# Patient Record
Sex: FEMALE | Race: WHITE | NOT HISPANIC OR LATINO | URBAN - METROPOLITAN AREA
[De-identification: names, ages, dates, MRNs, and addresses within clinical notes are randomized per-mention and may not be internally consistent; named-entity substitution may affect disease eponyms.]

---

## 2023-11-08 ENCOUNTER — OFFICE VISIT (OUTPATIENT)
Dept: OBSTETRICS AND GYNECOLOGY | Facility: CLINIC | Age: 43
End: 2023-11-08
Payer: COMMERCIAL

## 2023-11-08 VITALS
DIASTOLIC BLOOD PRESSURE: 75 MMHG | SYSTOLIC BLOOD PRESSURE: 127 MMHG | WEIGHT: 285.2 LBS | HEIGHT: 64 IN | BODY MASS INDEX: 48.69 KG/M2

## 2023-11-08 DIAGNOSIS — N94.6 DYSMENORRHEA: ICD-10-CM

## 2023-11-08 DIAGNOSIS — Z01.419 WELL WOMAN EXAM WITH ROUTINE GYNECOLOGICAL EXAM: Primary | ICD-10-CM

## 2023-11-08 PROCEDURE — 1036F TOBACCO NON-USER: CPT | Performed by: OBSTETRICS & GYNECOLOGY

## 2023-11-08 PROCEDURE — 99396 PREV VISIT EST AGE 40-64: CPT | Performed by: OBSTETRICS & GYNECOLOGY

## 2023-11-08 RX ORDER — NAPROXEN 500 MG/1
500 TABLET ORAL
Qty: 60 TABLET | Refills: 11 | Status: SHIPPED | OUTPATIENT
Start: 2023-11-08 | End: 2024-11-07

## 2023-11-08 ASSESSMENT — PAIN SCALES - GENERAL: PAINLEVEL: 0-NO PAIN

## 2023-11-08 ASSESSMENT — ENCOUNTER SYMPTOMS
HEMATOLOGIC/LYMPHATIC NEGATIVE: 0
ALLERGIC/IMMUNOLOGIC NEGATIVE: 0
ENDOCRINE NEGATIVE: 0
CARDIOVASCULAR NEGATIVE: 0
NEUROLOGICAL NEGATIVE: 0
CONSTITUTIONAL NEGATIVE: 0
EYES NEGATIVE: 0
PSYCHIATRIC NEGATIVE: 0
GASTROINTESTINAL NEGATIVE: 0
RESPIRATORY NEGATIVE: 0
MUSCULOSKELETAL NEGATIVE: 0

## 2023-11-08 NOTE — PROGRESS NOTES
History Of Present Illness  Routine Gyn Exam  Joel Sood here for routine WWE. Current complaints: none. She takes Naproxen PRN for dysmenorrhea. Rare bleeding with IUD     Gynecologic History  Patient's last menstrual period was 10/30/2023.  Contraception: IUD Kyleena placed   Last Pap: 2020. Results were: norm/neg  Last mammogram: . Results were: normal  Last Colonoscopy:  NA  Last DEXA: NA  Lipid/DM: with PCP    Obstetric History  OB History    Para Term  AB Living   0 0 0 0 0 0   SAB IAB Ectopic Multiple Live Births   0 0 0 0 0        Past Medical History  She has no past medical history on file.    Surgical History  She has a past surgical history that includes Mouth surgery (2014).     Social History  She reports that she has never smoked. She has never used smokeless tobacco. She reports current alcohol use.  Drug: Marijuana.    Family History  No family history on file.     Allergies  Patient has no known allergies.       Physical Exam  Constitutional:       Appearance: Normal appearance.   Pulmonary:      Effort: Pulmonary effort is normal.   Chest:   Breasts:     Right: Normal. No swelling, inverted nipple, mass or skin change.      Left: Normal. No swelling, inverted nipple, mass or skin change.   Abdominal:      General: Abdomen is flat. There is no distension.      Palpations: Abdomen is soft.      Tenderness: There is no abdominal tenderness.   Genitourinary:     Labia:         Right: No rash, tenderness or lesion.         Left: No rash, tenderness or lesion.       Urethra: No prolapse.      Vagina: Normal.      Cervix: Normal.      Uterus: Normal. Not enlarged.       Adnexa:         Right: No mass or tenderness.          Left: No mass.        Comments: IUD strings palpable on bimanual  Neurological:      General: No focal deficit present.      Mental Status: She is alert.   Psychiatric:         Mood and Affect: Mood normal.          Last Recorded Vitals  Blood  "pressure 127/75, height 1.613 m (5' 3.5\"), weight 129 kg (285 lb 3.2 oz), last menstrual period 10/30/2023.    Assessment/Plan   Diagnoses and all orders for this visit:  Well woman exam with routine gynecological exam  -     BI mammo bilateral screening tomosynthesis; Future  Dysmenorrhea  -     naproxen (Naprosyn) 500 mg tablet; Take 1 tablet (500 mg) by mouth 2 times a day with meals.    Contraception:   PAP: due July 2025    Mammogram: ordered   Colonoscopy:  NA   DEXA: NA   Lipid/DM: obtained through PCP    Refill for Naproxen provided.     Juancarlos Brink MD  "

## 2023-11-09 ENCOUNTER — TELEPHONE (OUTPATIENT)
Dept: OBSTETRICS AND GYNECOLOGY | Facility: CLINIC | Age: 43
End: 2023-11-09
Payer: COMMERCIAL

## 2023-11-09 NOTE — TELEPHONE ENCOUNTER
Naproxen with sodium 550 mg is what the pt was prescribed before and pt wanted to know if medication is the same. Pt stated the medication prescribed before works best for her.

## 2024-01-08 ENCOUNTER — APPOINTMENT (OUTPATIENT)
Dept: OBSTETRICS AND GYNECOLOGY | Facility: CLINIC | Age: 44
End: 2024-01-08
Payer: COMMERCIAL

## 2024-04-29 ENCOUNTER — LAB (OUTPATIENT)
Dept: LAB | Facility: LAB | Age: 44
End: 2024-04-29
Payer: COMMERCIAL

## 2024-04-29 DIAGNOSIS — E66.01 MORBID (SEVERE) OBESITY DUE TO EXCESS CALORIES (MULTI): Primary | ICD-10-CM

## 2024-04-29 LAB
ALBUMIN SERPL BCP-MCNC: 4.1 G/DL (ref 3.4–5)
ALP SERPL-CCNC: 55 U/L (ref 33–110)
ALT SERPL W P-5'-P-CCNC: 9 U/L (ref 7–45)
ANION GAP SERPL CALC-SCNC: 11 MMOL/L (ref 10–20)
AST SERPL W P-5'-P-CCNC: 15 U/L (ref 9–39)
BILIRUB DIRECT SERPL-MCNC: 0.1 MG/DL (ref 0–0.3)
BILIRUB SERPL-MCNC: 0.3 MG/DL (ref 0–1.2)
BUN SERPL-MCNC: 17 MG/DL (ref 6–23)
CALCIUM SERPL-MCNC: 9.3 MG/DL (ref 8.6–10.3)
CHLORIDE SERPL-SCNC: 105 MMOL/L (ref 98–107)
CHOLEST SERPL-MCNC: 154 MG/DL (ref 0–199)
CHOLESTEROL/HDL RATIO: 3.4
CO2 SERPL-SCNC: 25 MMOL/L (ref 21–32)
CREAT SERPL-MCNC: 0.85 MG/DL (ref 0.5–1.05)
EGFRCR SERPLBLD CKD-EPI 2021: 87 ML/MIN/1.73M*2
ERYTHROCYTE [DISTWIDTH] IN BLOOD BY AUTOMATED COUNT: 11.9 % (ref 11.5–14.5)
GLUCOSE SERPL-MCNC: 88 MG/DL (ref 74–99)
HCT VFR BLD AUTO: 39.4 % (ref 36–46)
HDLC SERPL-MCNC: 45.4 MG/DL
HGB BLD-MCNC: 13.2 G/DL (ref 12–16)
LDLC SERPL CALC-MCNC: 95 MG/DL
MCH RBC QN AUTO: 31.4 PG (ref 26–34)
MCHC RBC AUTO-ENTMCNC: 33.5 G/DL (ref 32–36)
MCV RBC AUTO: 94 FL (ref 80–100)
NON HDL CHOLESTEROL: 109 MG/DL (ref 0–149)
NRBC BLD-RTO: 0 /100 WBCS (ref 0–0)
PLATELET # BLD AUTO: 306 X10*3/UL (ref 150–450)
POTASSIUM SERPL-SCNC: 4.4 MMOL/L (ref 3.5–5.3)
PROT SERPL-MCNC: 6.5 G/DL (ref 6.4–8.2)
RBC # BLD AUTO: 4.2 X10*6/UL (ref 4–5.2)
SODIUM SERPL-SCNC: 137 MMOL/L (ref 136–145)
TRIGL SERPL-MCNC: 67 MG/DL (ref 0–149)
VLDL: 13 MG/DL (ref 0–40)
WBC # BLD AUTO: 8.7 X10*3/UL (ref 4.4–11.3)

## 2024-04-29 PROCEDURE — 82248 BILIRUBIN DIRECT: CPT

## 2024-04-29 PROCEDURE — 85027 COMPLETE CBC AUTOMATED: CPT

## 2024-04-29 PROCEDURE — 36415 COLL VENOUS BLD VENIPUNCTURE: CPT

## 2024-04-29 PROCEDURE — 80061 LIPID PANEL: CPT

## 2024-04-29 PROCEDURE — 80053 COMPREHEN METABOLIC PANEL: CPT

## 2024-11-15 DIAGNOSIS — N94.6 DYSMENORRHEA: ICD-10-CM

## 2024-11-15 RX ORDER — NAPROXEN 500 MG/1
500 TABLET ORAL
Qty: 60 TABLET | Refills: 11 | Status: SHIPPED | OUTPATIENT
Start: 2024-11-15

## 2024-11-27 ENCOUNTER — APPOINTMENT (OUTPATIENT)
Dept: OBSTETRICS AND GYNECOLOGY | Facility: CLINIC | Age: 44
End: 2024-11-27
Payer: COMMERCIAL

## 2024-11-27 ENCOUNTER — APPOINTMENT (OUTPATIENT)
Dept: RADIOLOGY | Facility: CLINIC | Age: 44
End: 2024-11-27
Payer: COMMERCIAL

## 2024-11-27 VITALS
SYSTOLIC BLOOD PRESSURE: 140 MMHG | DIASTOLIC BLOOD PRESSURE: 83 MMHG | HEIGHT: 64 IN | BODY MASS INDEX: 41.83 KG/M2 | WEIGHT: 245 LBS

## 2024-11-27 DIAGNOSIS — Z01.419 WELL WOMAN EXAM WITH ROUTINE GYNECOLOGICAL EXAM: Primary | ICD-10-CM

## 2024-11-27 PROCEDURE — 99396 PREV VISIT EST AGE 40-64: CPT | Performed by: OBSTETRICS & GYNECOLOGY

## 2024-11-27 PROCEDURE — 1036F TOBACCO NON-USER: CPT | Performed by: OBSTETRICS & GYNECOLOGY

## 2024-11-27 PROCEDURE — 3008F BODY MASS INDEX DOCD: CPT | Performed by: OBSTETRICS & GYNECOLOGY

## 2024-11-27 PROCEDURE — 96160 PT-FOCUSED HLTH RISK ASSMT: CPT | Performed by: OBSTETRICS & GYNECOLOGY

## 2024-11-27 PROCEDURE — 88175 CYTOPATH C/V AUTO FLUID REDO: CPT

## 2024-11-27 PROCEDURE — 87624 HPV HI-RISK TYP POOLED RSLT: CPT

## 2024-11-27 RX ORDER — LEVONORGESTREL 19.5 MG/1
1 INTRAUTERINE DEVICE INTRAUTERINE ONCE
COMMUNITY

## 2024-11-27 RX ORDER — LEVONORGESTREL 13.5 MG/1
INTRAUTERINE DEVICE INTRAUTERINE
COMMUNITY
End: 2024-11-27 | Stop reason: WASHOUT

## 2024-11-27 RX ORDER — AMLODIPINE BESYLATE 10 MG/1
TABLET ORAL
COMMUNITY
Start: 2024-08-30

## 2024-11-27 RX ORDER — SPIRONOLACTONE 25 MG/1
25 TABLET ORAL
COMMUNITY
Start: 2024-03-01 | End: 2025-03-01

## 2024-11-27 RX ORDER — LOSARTAN POTASSIUM 100 MG/1
100 TABLET ORAL
COMMUNITY
Start: 2016-05-12

## 2024-11-27 ASSESSMENT — PAIN SCALES - GENERAL: PAINLEVEL_OUTOF10: 0-NO PAIN

## 2024-11-27 NOTE — PROGRESS NOTES
History Of Present Illness  Routine Gyn Exam  Joel Sood here for routine WWE. Current complaints: She uses Kyleena for BC.  Rare bleeding.     Gynecologic History  Patient's last menstrual period was 2024 (approximate).  Contraception: Kyleena placed   Last Pap: 2020. Results were: normal/neg  Last mammogram: . Results were: normal  Last Colonoscopy:  NA. Results were: NA  Last DEXA: NA. Results were: NA  Lipid/DM: with PCP    Obstetric History  OB History    Para Term  AB Living   0 0 0 0 0 0   SAB IAB Ectopic Multiple Live Births   0 0 0 0 0        Past Medical History  She has no past medical history on file.    Surgical History  She has a past surgical history that includes Mouth surgery (2014).     Social History  She reports that she has never smoked. She has never used smokeless tobacco. She reports current alcohol use.  Drug: Marijuana.    Family History  No family history on file.     Allergies  Patient has no known allergies.     Chaperone: present  Physical Exam  Exam conducted with a chaperone present.   Constitutional:       Appearance: Normal appearance.   Pulmonary:      Effort: Pulmonary effort is normal.   Chest:   Breasts:     Right: Normal. No mass, nipple discharge or skin change.      Left: Normal. No mass, nipple discharge or skin change.   Abdominal:      General: Abdomen is flat. There is no distension.      Palpations: Abdomen is soft.      Tenderness: There is no abdominal tenderness.   Genitourinary:     Labia:         Right: No rash, tenderness or lesion.         Left: No rash, tenderness or lesion.       Urethra: No prolapse.      Vagina: Normal. No vaginal discharge or bleeding.      Cervix: No friability or lesion.      Uterus: Normal. Not enlarged and not tender.       Adnexa:         Right: No mass, tenderness or fullness.          Left: No mass, tenderness or fullness.        Rectum: Normal.   Neurological:      General: No focal deficit  "present.      Mental Status: She is alert.   Psychiatric:         Mood and Affect: Mood normal.       Last Recorded Vitals  Blood pressure 140/83, height 1.613 m (5' 3.5\"), weight 111 kg (245 lb), last menstrual period 11/07/2024.    Assessment/Plan   Problem List Items Addressed This Visit    None  Visit Diagnoses       Well woman exam with routine gynecological exam    -  Primary    Relevant Orders    THINPREP PAP TEST            Contraception: Kyleena  PAP: obtained   Mammogram: ordered   Colonoscopy:  NA   DEXA: NA   Lipid/DM: obtained through PCP     Juancarlos Brink MD  "

## 2024-12-24 ENCOUNTER — LAB (OUTPATIENT)
Dept: LAB | Facility: LAB | Age: 44
End: 2024-12-24
Payer: COMMERCIAL

## 2024-12-24 ENCOUNTER — HOSPITAL ENCOUNTER (OUTPATIENT)
Dept: RADIOLOGY | Facility: CLINIC | Age: 44
Discharge: HOME | End: 2024-12-24
Payer: COMMERCIAL

## 2024-12-24 VITALS — HEIGHT: 64 IN | WEIGHT: 244.71 LBS | BODY MASS INDEX: 41.78 KG/M2

## 2024-12-24 DIAGNOSIS — Z01.419 WELL WOMAN EXAM WITH ROUTINE GYNECOLOGICAL EXAM: ICD-10-CM

## 2024-12-24 DIAGNOSIS — I10 ESSENTIAL (PRIMARY) HYPERTENSION: Primary | ICD-10-CM

## 2024-12-24 LAB
ALBUMIN SERPL BCP-MCNC: 4.1 G/DL (ref 3.4–5)
ANION GAP SERPL CALC-SCNC: 14 MMOL/L (ref 10–20)
BUN SERPL-MCNC: 16 MG/DL (ref 6–23)
CALCIUM SERPL-MCNC: 9.4 MG/DL (ref 8.6–10.3)
CHLORIDE SERPL-SCNC: 105 MMOL/L (ref 98–107)
CO2 SERPL-SCNC: 21 MMOL/L (ref 21–32)
CREAT SERPL-MCNC: 0.77 MG/DL (ref 0.5–1.05)
EGFRCR SERPLBLD CKD-EPI 2021: >90 ML/MIN/1.73M*2
ERYTHROCYTE [DISTWIDTH] IN BLOOD BY AUTOMATED COUNT: 12.1 % (ref 11.5–14.5)
GLUCOSE SERPL-MCNC: 81 MG/DL (ref 74–99)
HCT VFR BLD AUTO: 37.9 % (ref 36–46)
HGB BLD-MCNC: 12.9 G/DL (ref 12–16)
MCH RBC QN AUTO: 31.1 PG (ref 26–34)
MCHC RBC AUTO-ENTMCNC: 34 G/DL (ref 32–36)
MCV RBC AUTO: 91 FL (ref 80–100)
NRBC BLD-RTO: 0 /100 WBCS (ref 0–0)
PHOSPHATE SERPL-MCNC: 3.3 MG/DL (ref 2.5–4.9)
PLATELET # BLD AUTO: 321 X10*3/UL (ref 150–450)
POTASSIUM SERPL-SCNC: 4.3 MMOL/L (ref 3.5–5.3)
RBC # BLD AUTO: 4.15 X10*6/UL (ref 4–5.2)
SODIUM SERPL-SCNC: 136 MMOL/L (ref 136–145)
WBC # BLD AUTO: 8 X10*3/UL (ref 4.4–11.3)

## 2024-12-24 PROCEDURE — 85027 COMPLETE CBC AUTOMATED: CPT

## 2024-12-24 PROCEDURE — 77067 SCR MAMMO BI INCL CAD: CPT

## 2024-12-24 PROCEDURE — 80069 RENAL FUNCTION PANEL: CPT

## 2025-05-22 ENCOUNTER — APPOINTMENT (OUTPATIENT)
Dept: OPHTHALMOLOGY | Age: 45
End: 2025-05-22
Payer: COMMERCIAL

## 2025-05-22 DIAGNOSIS — H52.203 MYOPIA OF BOTH EYES WITH ASTIGMATISM AND PRESBYOPIA: Primary | ICD-10-CM

## 2025-05-22 DIAGNOSIS — H52.13 MYOPIA OF BOTH EYES WITH ASTIGMATISM AND PRESBYOPIA: Primary | ICD-10-CM

## 2025-05-22 DIAGNOSIS — H52.4 MYOPIA OF BOTH EYES WITH ASTIGMATISM AND PRESBYOPIA: Primary | ICD-10-CM

## 2025-05-22 PROCEDURE — 92310 CONTACT LENS FITTING OU: CPT | Performed by: OPTOMETRIST

## 2025-05-22 PROCEDURE — 92004 COMPRE OPH EXAM NEW PT 1/>: CPT | Performed by: OPTOMETRIST

## 2025-05-22 PROCEDURE — 92015 DETERMINE REFRACTIVE STATE: CPT | Performed by: OPTOMETRIST

## 2025-05-22 ASSESSMENT — REFRACTION_MANIFEST
OS_AXIS: 160
OD_CYLINDER: -0.50
OD_SPHERE: -3.75
OS_AXIS: 180
OD_AXIS: 152
OD_SPHERE: -4.00
OD_CYLINDER: SPHERE
OS_CYLINDER: -1.00
OS_SPHERE: -4.00
METHOD_AUTOREFRACTION: 1
OS_CYLINDER: -0.75
OS_SPHERE: -4.75

## 2025-05-22 ASSESSMENT — REFRACTION_CURRENTRX
OD_CYLINDER: SPHERE
OD_SPHERE: -4.25
OS_BASECURVE: 8.7
OD_DIAMETER: 14.2
OS_CYLINDER: SPHERE
OS_BRAND: AIR OPTIX FOR ASTIGMATISM
OD_BRAND: AIR OPTIX HYDRAGLYDE
OS_AXIS: 180
OD_BRAND: ACUVUE OASYS
OD_DIAMETER: 14.0
OS_BASECURVE: 8.4
OD_BASECURVE: 8.6
OS_BRAND: ACUVUE OASYS FOR ASTIGMATISM
OD_CYLINDER: SPHERE
OD_BRAND: ACUVUE OASYS
OS_DIAMETER: 14.5
OD_BASECURVE: 8.4
OS_DIAMETER: 14.0
OS_AXIS: 180
OS_SPHERE: -5.50
OS_BASECURVE: 8.4
OS_SPHERE: -4.50
OD_BASECURVE: 8.4
OD_SPHERE: -3.75
OS_SPHERE: -4.50
OS_DIAMETER: 14.0
OD_SPHERE: -3.75
OS_BRAND: ACUVUE OASYS
OD_CYLINDER: SPHERE
OS_CYLINDER: -0.75
OS_CYLINDER: -0.75
OD_DIAMETER: 14.0

## 2025-05-22 ASSESSMENT — CONF VISUAL FIELD
OD_INFERIOR_NASAL_RESTRICTION: 0
OS_INFERIOR_NASAL_RESTRICTION: 0
OS_SUPERIOR_TEMPORAL_RESTRICTION: 0
OD_SUPERIOR_TEMPORAL_RESTRICTION: 0
OD_SUPERIOR_NASAL_RESTRICTION: 0
OS_INFERIOR_TEMPORAL_RESTRICTION: 0
OD_NORMAL: 1
OS_NORMAL: 1
OD_INFERIOR_TEMPORAL_RESTRICTION: 0
OS_SUPERIOR_NASAL_RESTRICTION: 0

## 2025-05-22 ASSESSMENT — ENCOUNTER SYMPTOMS
EYES NEGATIVE: 1
NEUROLOGICAL NEGATIVE: 0
GASTROINTESTINAL NEGATIVE: 0
RESPIRATORY NEGATIVE: 0
CONSTITUTIONAL NEGATIVE: 0
ENDOCRINE NEGATIVE: 0
ALLERGIC/IMMUNOLOGIC NEGATIVE: 0
MUSCULOSKELETAL NEGATIVE: 0
CARDIOVASCULAR NEGATIVE: 0
HEMATOLOGIC/LYMPHATIC NEGATIVE: 0
PSYCHIATRIC NEGATIVE: 0

## 2025-05-22 ASSESSMENT — VISUAL ACUITY
VA_OR_OD_CURRENT_RX: 20/20
OS_CC+: -2
OD_PH_CC+: +2
OS_CC: 20/25
METHOD: SNELLEN - LINEAR
OD_CC: 20/70
VA_OR_OS_CURRENT_RX: 20/20
OD_PH_CC: 20/40
CORRECTION_TYPE: CONTACTS

## 2025-05-22 ASSESSMENT — TONOMETRY
OD_IOP_MMHG: 15
IOP_METHOD: TONOPEN
OS_IOP_MMHG: 15

## 2025-05-22 ASSESSMENT — SLIT LAMP EXAM - LIDS
COMMENTS: GOOD POSITION
COMMENTS: GOOD POSITION

## 2025-05-22 ASSESSMENT — EXTERNAL EXAM - LEFT EYE: OS_EXAM: NORMAL

## 2025-05-22 ASSESSMENT — CUP TO DISC RATIO
OD_RATIO: .3
OS_RATIO: .3

## 2025-05-22 ASSESSMENT — EXTERNAL EXAM - RIGHT EYE: OD_EXAM: NORMAL

## 2025-05-22 NOTE — PROGRESS NOTES
A spectacle prescription was dispensed to be used as needed. Will defer bifocal add for now.  A contact lens prescription was dispensed at the patient's request. Reduced minus power to save near vision for as long as possible. Patient advised of this.   The patient was asked to return to our clinic in one year or sooner if ocular or vision changes occur.

## 2025-12-03 ENCOUNTER — APPOINTMENT (OUTPATIENT)
Dept: OBSTETRICS AND GYNECOLOGY | Facility: CLINIC | Age: 45
End: 2025-12-03
Payer: COMMERCIAL

## 2026-05-28 ENCOUNTER — APPOINTMENT (OUTPATIENT)
Dept: OPHTHALMOLOGY | Age: 46
End: 2026-05-28
Payer: COMMERCIAL